# Patient Record
Sex: FEMALE | Race: WHITE
[De-identification: names, ages, dates, MRNs, and addresses within clinical notes are randomized per-mention and may not be internally consistent; named-entity substitution may affect disease eponyms.]

---

## 2017-06-02 ENCOUNTER — HOSPITAL ENCOUNTER (EMERGENCY)
Dept: HOSPITAL 11 - JP.ED | Age: 62
Discharge: NURSING, REHAB OR LONG-TERM CARE | End: 2017-06-02
Payer: MEDICARE

## 2017-06-02 VITALS — SYSTOLIC BLOOD PRESSURE: 151 MMHG | DIASTOLIC BLOOD PRESSURE: 74 MMHG

## 2017-06-02 DIAGNOSIS — Z88.2: ICD-10-CM

## 2017-06-02 DIAGNOSIS — I12.0: ICD-10-CM

## 2017-06-02 DIAGNOSIS — N18.6: ICD-10-CM

## 2017-06-02 DIAGNOSIS — A41.9: Primary | ICD-10-CM

## 2017-06-02 DIAGNOSIS — E03.9: ICD-10-CM

## 2017-06-02 DIAGNOSIS — Z79.82: ICD-10-CM

## 2017-06-02 DIAGNOSIS — Z79.01: ICD-10-CM

## 2017-06-02 DIAGNOSIS — Z79.899: ICD-10-CM

## 2017-06-02 PROCEDURE — 96361 HYDRATE IV INFUSION ADD-ON: CPT

## 2017-06-02 PROCEDURE — 83605 ASSAY OF LACTIC ACID: CPT

## 2017-06-02 PROCEDURE — 84484 ASSAY OF TROPONIN QUANT: CPT

## 2017-06-02 PROCEDURE — 85025 COMPLETE CBC W/AUTO DIFF WBC: CPT

## 2017-06-02 PROCEDURE — 93005 ELECTROCARDIOGRAM TRACING: CPT

## 2017-06-02 PROCEDURE — 80053 COMPREHEN METABOLIC PANEL: CPT

## 2017-06-02 PROCEDURE — 99284 EMERGENCY DEPT VISIT MOD MDM: CPT

## 2017-06-02 PROCEDURE — 87040 BLOOD CULTURE FOR BACTERIA: CPT

## 2017-06-02 PROCEDURE — 36415 COLL VENOUS BLD VENIPUNCTURE: CPT

## 2017-06-02 PROCEDURE — 96365 THER/PROPH/DIAG IV INF INIT: CPT

## 2017-06-02 PROCEDURE — 96375 TX/PRO/DX INJ NEW DRUG ADDON: CPT

## 2017-06-02 PROCEDURE — 93010 ELECTROCARDIOGRAM REPORT: CPT

## 2017-06-02 PROCEDURE — 87804 INFLUENZA ASSAY W/OPTIC: CPT

## 2017-06-02 PROCEDURE — 71020: CPT

## 2017-06-02 NOTE — EDM.PDOC
ED HPI GENERAL MEDICAL PROBLEM





- General


Chief Complaint: General


Stated Complaint: NAUSEA


Time Seen by Provider: 17 11:06


Source of Information: Reports: Patient


History Limitations: Reports: No Limitations





- History of Present Illness


INITIAL COMMENTS - FREE TEXT/NARRATIVE: 





This patient presents with a history of vomiting.  She has a history of kidney 

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  She is a dialysis 

patient.  2 days ago she had dry heaves and this caused her to miss her 

dialysis appointment.  This morning she went to dialysis and they dialyzed her 

for just 2 hours.  She's had problems with her ports with infections including 

one episode with a peritoneal dialysis.  Her present port was placed 

approximately 2 weeks ago.  Her blood pressure at dialysis was as low as 85/42.

  She was also noted to be febrile.





- Related Data


 Allergies











Allergy/AdvReac Type Severity Reaction Status Date / Time


 


Sulfa (Sulfonamide Allergy  Hives Verified 11/20/15 07:21





Antibiotics)     











Home Meds: 


 Home Meds





Aspirin [Adult Low Dose Aspirin EC] 2 tab PO BEDTIME 11/19/15 [History]


Cetirizine HCl [Zyrtec] 10 mg PO DAILY 11/19/15 [History]


Levothyroxine [Synthroid] 100 mcg PO DAILY 11/19/15 [History]


Lisinopril/Hydrochlorothiazide [Lisinopril-Hctz 20-12.5 mg Tab] 1 tab PO DAILY 

11/19/15 [History]


Montelukast [Singulair] 10 mg PO BEDTIME 11/19/15 [History]


Calcium Acetate [PhosLo] 3 tab PO ASDIRECTED 17 [History]


amLODIPine Besylate [Amlodipine Besylate] 1 tab PO DAILY 17 [History]











Past Medical History


HEENT History: Reports: Hard of Hearing, Impaired Vision


Cardiovascular History: Reports: Hypertension


Genitourinary History: Reports: Dialysis, Renal Disease, Other (See Below)


Other Genitourinary History: end stage renal disease


Musculoskeletal History: Reports: Fracture


Endocrine/Metabolic History: Reports: Hypothyroidism


Hematologic History: Reports: Anesthesia Reaction, Anticoagulation Therapy


Dermatologic History: Reports: Other (See Below)


Other Dermatologic History: rowsasa





- Infectious Disease History


Infectious Disease History: Reports: Chicken Pox, Measles, Mumps





- Past Surgical History


Female  Surgical History: Reports:  Section





Social & Family History





- Tobacco Use


Smoking Status *Q: Never Smoker


Second Hand Smoke Exposure: No





- Caffeine Use


Caffeine Use: Reports: None





- Alcohol Use


Days Per Week of Alcohol Use: 7


Number of Drinks Per Day: 1


Total Drinks Per Week: 7





- Recreational Drug Use


Recreational Drug Use: No





ED ROS GENERAL





- Review of Systems


Review Of Systems: See Below


Constitutional: Reports: Fever, Chills


HEENT: Reports: No Symptoms


Respiratory: Reports: No Symptoms


Cardiovascular: Reports: No Symptoms


Endocrine: Reports: No Symptoms


GI/Abdominal: Reports: Nausea, Vomiting


: Reports: No Symptoms


Musculoskeletal: Reports: No Symptoms


Skin: Reports: No Symptoms


Neurological: Reports: No Symptoms





ED EXAM, GENERAL





- Physical Exam


Exam: See Below


Exam Limited By: No Limitations


General Appearance: Alert, Mild Distress, Other (Chronically ill appearing)


Eye Exam: Bilateral Eye: Normal Inspection


Ears: Normal TMs


Nose: Normal Inspection


Throat/Mouth: Other (Dry mouth)


Head: Atraumatic


Neck: Normal Inspection


Respiratory/Chest: No Respiratory Distress, Lungs Clear


Cardiovascular: Regular Rate, Rhythm, Other (There is a port in the right 

subclavian area a)


Neurological: Alert


Psychiatric: Normal Affect


Skin Exam: Warm, Dry





Course





- Vital Signs


Text/Narrative:: 





Discussion with Dr. Palacios


The nephrologist at [facility].  He requested Zosyn and vancomycin.  She 

was given Zosyn 3.375 and vancomycin 1 g IV.  Discussion with Dr. Branch the 

hospitalist in [place] who accepted the patient.


Last Recorded V/S: 





 Last Vital Signs











Temp  39.6 C H  17 10:52


 


Pulse  106 H  17 10:52


 


Resp  16   17 10:52


 


BP  138/66   17 10:52


 


Pulse Ox  95   17 10:52














- Orders/Labs/Meds


Orders: 





 Active Orders 24 hr











 Category Date Time Status


 


 EKG Documentation Completion [RC] ASDIRECTED Care  17 11:18 Active


 


 CULTURE BLOOD [BC] Urgent Lab  17 12:19 Received


 


 CULTURE BLOOD [BC] Urgent Lab  17 12:19 Received


 


 INFLUENZA A+B AG SCREEN [RM] Urgent Lab  17 12:47 Ordered


 


 UA W/MICROSCOPIC [URIN] Urgent Lab  17 11:18 Uncollected


 


 Piperacillin/Tazobactam/Dext [Zosyn in Dextrose Iso- Med  17 13:00 Active





 Osmotic 3.375 GM] 3.375 gm   





 Premix Bag 1 bag   





 IV ONETIME   


 


 Sodium Chloride 0.9% [Normal Saline] 1,000 ml Med  17 11:30 Active





 IV ASDIRECTED   


 


 Sodium Chloride 0.9% [Saline Flush] Med  17 11:22 Active





 10 ml FLUSH ASDIRECTED PRN   


 


 Vancomycin 1 gm Med  17 12:31 Active





 Sodium Chloride 0.9% [Normal Saline] 250 ml   





 IV ONETIME   


 


 Blood Culture x2 Reflex Set [OM.PC] Urgent Oth  17 11:18 Ordered


 


 Saline Lock Insert [OM.PC] Urgent Oth  17 11:22 Ordered


 


 EKG 12 Lead [EK] Urgent Ther  17 11:18 Ordered








 Medication Orders





Sodium Chloride (Normal Saline)  1,000 mls @ 999 mls/hr IV ASDIRECTED ARELIS


   Last Admin: 17 12:41  Dose: 999 mls/hr


Vancomycin HCl 1 gm/ Sodium (Chloride)  250 mls @ 150 mls/hr IV ONETIME ONE


   Stop: 17 14:10


Piperacillin/Tazobactam/ (Dextrose 3.375 gm/ Premix)  50 mls @ 100 mls/hr IV 

ONETIME ONE


   Stop: 17 13:29


   Last Admin: 17 12:40  Dose: 100 mls/hr


Sodium Chloride (Saline Flush)  10 ml FLUSH ASDIRECTED PRN


   PRN Reason: Keep Vein Open








Labs: 





 Laboratory Tests











  17 Range/Units





  12:19 12:19 12:19 


 


WBC   16.8 H   (4.5-11.0)  K/uL


 


RBC   3.37   (3.30-5.50)  M/uL


 


Hgb   10.4 L D   (12.0-15.0)  g/dL


 


Hct   31.8 L   (36.0-48.0)  %


 


MCV   94   (80-98)  fL


 


MCH   31   (27-31)  pg


 


MCHC   33   (32-36)  %


 


Plt Count   211   (150-400)  K/uL


 


Neut % (Auto)   93 H   (36-66)  %


 


Lymph % (Auto)   2 L   (24-44)  %


 


Mono % (Auto)   5   (2-6)  %


 


Eos % (Auto)   0 L   (2-4)  %


 


Baso % (Auto)   0   (0-1)  %


 


Sodium    135 L  (140-148)  mmol/L


 


Potassium    3.8  (3.6-5.2)  mmol/L


 


Chloride    95 L  (100-108)  mmol/L


 


Carbon Dioxide    28  (21-32)  mmol/L


 


Anion Gap    15.8 H  (5.0-14.0)  mmol/L


 


BUN    41 H  (7-18)  mg/dL


 


Creatinine    7.2 H*  (0.6-1.0)  mg/dL


 


Est Cr Clr Drug Dosing    7.58  mL/min


 


Estimated GFR (MDRD)    6 L  (>60)  


 


Glucose    110 H  ()  mg/dL


 


Lactic Acid  4.0 H    (0.4-2.0)  mmol/L


 


Calcium    8.4 L  (8.5-10.1)  mg/dL


 


Total Bilirubin    0.7  (0.2-1.0)  mg/dL


 


AST    146 H  (15-37)  U/L


 


ALT    102 H  (12-78)  U/L


 


Alkaline Phosphatase    100  ()  U/L


 


Troponin I    < 0.017  (0.000-0.056)  ng/mL


 


Total Protein    6.9  (6.4-8.2)  g/dL


 


Albumin    2.5 L  (3.4-5.0)  g/dL


 


Globulin    4.4 H  (2.3-3.5)  g/dL


 


Albumin/Globulin Ratio    0.6 L  (1.2-2.2)  











Meds: 





Medications











Generic Name Dose Route Start Last Admin





  Trade Name Flaquita  PRN Reason Stop Dose Admin


 


Sodium Chloride  1,000 mls @ 999 mls/hr  17 11:30  17 12:41





  Normal Saline  IV   999 mls/hr





  ASDIRECTED ARELIS   Administration


 


Vancomycin HCl 1 gm/ Sodium  250 mls @ 150 mls/hr  17 12:31  





  Chloride  IV  17 14:10  





  ONETIME ONE   


 


Piperacillin/Tazobactam/  50 mls @ 100 mls/hr  17 13:00  17 12:40





  Dextrose 3.375 gm/ Premix  IV  17 13:29  100 mls/hr





  ONETIME ONE   Administration


 


Sodium Chloride  10 ml  17 11:22  





  Saline Flush  FLUSH   





  ASDIRECTED PRN   





  Keep Vein Open   














Discontinued Medications














Generic Name Dose Route Start Last Admin





  Trade Name Freq  PRN Reason Stop Dose Admin


 


Piperacillin Sod/Tazobactam  50 mls @ 100 mls/hr  17 12:31  





  Sod 3.375 gm/ Sodium Chloride  IV  17 13:00  





  ONETIME ONE   














Departure





- Departure


Time of Disposition: 13:28


Disposition: DC/[facility] to [facility]


Condition: serious


Clinical Impression: 


 Sepsis








- Discharge Information


Forms:  ED Department Discharge





- My Orders


Last 24 Hours: 





My Active Orders





17 11:18


EKG Documentation Completion [RC] ASDIRECTED 


UA W/MICROSCOPIC [URIN] Urgent 


Blood Culture x2 Reflex Set [OM.PC] Urgent 


EKG 12 Lead [EK] Urgent 





17 11:22


Sodium Chloride 0.9% [Saline Flush]   10 ml FLUSH ASDIRECTED PRN 


Saline Lock Insert [OM.PC] Urgent 





17 11:30


Sodium Chloride 0.9% [Normal Saline] 1,000 ml IV ASDIRECTED 





17 12:19


CULTURE BLOOD [BC] Urgent 


CULTURE BLOOD [BC] Urgent 





17 12:31


Vancomycin 1 gm   Sodium Chloride 0.9% [Normal Saline] 250 ml IV ONETIME 





17 12:47


INFLUENZA A+B AG SCREEN [RM] Urgent 





17 13:00


Piperacillin/Tazobactam/Dext [Zosyn in Dextrose Iso-Osmotic 3.375 GM] 3.375 gm 

  Premix Bag 1 bag IV ONETIME 














- Assessment/Plan


Last 24 Hours: 





My Active Orders





17 11:18


EKG Documentation Completion [RC] ASDIRECTED 


UA W/MICROSCOPIC [URIN] Urgent 


Blood Culture x2 Reflex Set [OM.PC] Urgent 


EKG 12 Lead [EK] Urgent 





17 11:22


Sodium Chloride 0.9% [Saline Flush]   10 ml FLUSH ASDIRECTED PRN 


Saline Lock Insert [OM.PC] Urgent 





17 11:30


Sodium Chloride 0.9% [Normal Saline] 1,000 ml IV ASDIRECTED 





17 12:19


CULTURE BLOOD [BC] Urgent 


CULTURE BLOOD [BC] Urgent 





17 12:31


Vancomycin 1 gm   Sodium Chloride 0.9% [Normal Saline] 250 ml IV ONETIME 





17 12:47


INFLUENZA A+B AG SCREEN [RM] Urgent 





17 13:00


Piperacillin/Tazobactam/Dext [Zosyn in Dextrose Iso-Osmotic 3.375 GM] 3.375 gm 

  Premix Bag 1 bag IV ONETIME

## 2021-11-22 NOTE — OR
DATE OF PROCEDURE:  11/22/2021

 

SURGEON:  Win Yan MD

 

PROCEDURES:

1. Esophagogastroduodenoscopy.

2. Colonoscopy.

 

FINDINGS:

1. Mild inflammation at GE junction concerning for reflux disease (biopsied in all 4

    quadrants using cold biopsy forceps).

2. Diverticulosis, mild, limited to sigmoid colon without evidence of diverticulitis or

    bleeding.

 

COMPLICATIONS:  None.

 

ASSISTANT:  None.

 

ANESTHESIA:  MAC.

 

PREPROCEDURE DIAGNOSIS:  Epigastric pain, diarrhea.

 

POSTPROCEDURE DIAGNOSIS:  Epigastric pain, diarrhea.

 

RISKS:  Risks, benefits, alternatives, and limitations including, but not limited to

infection, bleeding, perforation, false positive, and false negatives were explained to the

patient and the patient  wished to proceed.

 

PROCEDURE IN DETAIL:  The patient was placed in left lateral decubitus position.  EGD scope

was introduced atraumatically to the second part of the duodenum.  No evidence of duodenitis

or ulceration were noted.  Within the stomach itself, there was no gastritis.  No

ulceration.  The patient had a very small amount of inflammation in the GE junction with

slightly irregular Z-line concerning for reflux disease, biopsied in all 4 quadrants using

cold biopsy forceps.  Air was removed from the stomach.

 

The esophagus was inspected without abnormality.

 

Digital rectal exam performed next.  The scope was advanced atraumatically to the ileocecal

valve.  A photo was taken.  The patient was noted have a tortuous sigmoid colon.

 

No evidence of old or new blood.  No masses.  No polyps.  The patient was noted to have

diverticulosis described as mild, limited to sigmoid colon without evidence of

diverticulitis or bleeding.  No abnormalities on retroflexion.

 

Greater than 8 minutes was spent removing the scope.  The patient tolerated procedure well.

 

 

 

 

Win Yan MD

DD:  11/22/2021 11:15:43

DT:  11/22/2021 11:40:41

Job #:  [id number]